# Patient Record
Sex: MALE | Race: BLACK OR AFRICAN AMERICAN | NOT HISPANIC OR LATINO | URBAN - METROPOLITAN AREA
[De-identification: names, ages, dates, MRNs, and addresses within clinical notes are randomized per-mention and may not be internally consistent; named-entity substitution may affect disease eponyms.]

---

## 2018-05-28 ENCOUNTER — EMERGENCY (EMERGENCY)
Facility: HOSPITAL | Age: 18
LOS: 1 days | Discharge: HOME | End: 2018-05-28
Attending: EMERGENCY MEDICINE | Admitting: EMERGENCY MEDICINE
Payer: COMMERCIAL

## 2018-05-28 VITALS
HEIGHT: 62 IN | OXYGEN SATURATION: 99 % | SYSTOLIC BLOOD PRESSURE: 112 MMHG | TEMPERATURE: 97 F | WEIGHT: 130.07 LBS | HEART RATE: 72 BPM | DIASTOLIC BLOOD PRESSURE: 65 MMHG | RESPIRATION RATE: 14 BRPM

## 2018-05-28 VITALS
RESPIRATION RATE: 16 BRPM | TEMPERATURE: 98 F | OXYGEN SATURATION: 100 % | SYSTOLIC BLOOD PRESSURE: 118 MMHG | DIASTOLIC BLOOD PRESSURE: 64 MMHG | HEART RATE: 70 BPM

## 2018-05-28 PROCEDURE — 96374 THER/PROPH/DIAG INJ IV PUSH: CPT

## 2018-05-28 PROCEDURE — 99285 EMERGENCY DEPT VISIT HI MDM: CPT

## 2018-05-28 PROCEDURE — 96375 TX/PRO/DX INJ NEW DRUG ADDON: CPT

## 2018-05-28 PROCEDURE — 99284 EMERGENCY DEPT VISIT MOD MDM: CPT | Mod: 25

## 2018-05-28 RX ORDER — ONDANSETRON 8 MG/1
4 TABLET, FILM COATED ORAL ONCE
Qty: 0 | Refills: 0 | Status: COMPLETED | OUTPATIENT
Start: 2018-05-28 | End: 2018-05-28

## 2018-05-28 RX ADMIN — ONDANSETRON 4 MILLIGRAM(S): 8 TABLET, FILM COATED ORAL at 14:39

## 2018-05-28 RX ADMIN — Medication 125 MILLIGRAM(S): at 13:27

## 2018-05-28 NOTE — ED PROVIDER NOTE - OBJECTIVE STATEMENT
17 y/o F pt with no significant PMHx brought into by the ambulance presents to the ED c/o food allergies today. As per EMS she was administered epinephrine and benedryl PTA. Pt states that she is allergic to shellfish and had some food last night, woke up with an allergic reaction. Denies fever, chills, nausea, vomiting, chest pain, SOB, dyspnea, or rash. No other complaints at this time.

## 2018-05-28 NOTE — ED ADULT NURSE REASSESSMENT NOTE - NS ED NURSE REASSESS COMMENT FT1
pt is stable, pt denies any diff breathing or SOB. pt denies any trouble swallowing/ pt vitals are stable.

## 2018-05-28 NOTE — ED ADULT NURSE NOTE - OBJECTIVE STATEMENT
17yo male BIBA, as per EMS "he's having an allergic reaction. Benadryl and Epi pen admin by EMS prior to ER arrival. pt indicate he made and ate a turkey sandwich the night prior (8 hours prior) and woke up feeling throat closing.

## 2024-01-16 NOTE — ED ADULT NURSE NOTE - FALLEN IN THE PAST
SICU ATTENDING ATTESTATION    I have seen and examined this patient on multidisciplinary SICU rounds thismorning. I have reviewed all new labs, imaging and reports. I have participated in formulating the plan for the day, and have read and agree with the history, ROS, exam, assessment and plan as stated above, with my additions listed below:     Did not sleep well, still agitated though better oriented and conversant. Likely combination delirium and EtOH withdrawal.   Was hypotensive on max dose precedex yesterday, decreased dose to 1.5.   Will give Seroquel tonite.   Continuing Phenobarb gtt for withdrawal.   Cont Zosyn for empyema.   KEZIA: creatinine increased yesterday, FeUrea calculated as intrinsic --> meds reviewed, Vanc was stopped yesterday, CrCl is still ~60 so will not change Zosyn dosing or Lovenox. Will order renal sono and check post-void residual. Voiding well without mccormack.     Total time spent in the care of this patient today (excluding procedures & teaching): 35 min                 Over 50% of the total time was spent in discussion and coordination of care with consulting services, dietary and rehab services.       Shelly Molina M.D., M.S.  Division of Acute Care Surgery SICU ATTENDING ATTESTATION    I have seen and examined this patient on multidisciplinary SICU rounds thismorning. I have reviewed all new labs, imaging and reports. I have participated in formulating the plan for the day, and have read and agree with the history, ROS, exam, assessment and plan as stated above, with my additions listed below:     Did not sleep well, still agitated though better oriented and conversant. Likely combination delirium and EtOH withdrawal.   Was hypotensive on max dose precedex yesterday, decreased dose to 1.5.   Will give Seroquel tonite.   Continuing Phenobarb gtt for withdrawal.   Cont Zosyn for empyema.   KEZIA: creatinine increased yesterday, FeUrea calculated as intrinsic --> meds reviewed, Vanc was stopped yesterday, CrCl is still ~60 so will not change Zosyn dosing or Lovenox. Will order renal sono and check post-void residual. Voiding well without mccormack.   Ulrasound performed to eval leg ededma --> no DVT.     Total time spent in the care of this patient today (excluding procedures & teaching): 35 min                 Over 50% of the total time was spent in discussion and coordination of care with consulting services, dietary and rehab services.       Shelly Molina M.D., M.S.  Division of Acute Care Surgery no